# Patient Record
Sex: FEMALE
[De-identification: names, ages, dates, MRNs, and addresses within clinical notes are randomized per-mention and may not be internally consistent; named-entity substitution may affect disease eponyms.]

---

## 2020-02-27 ENCOUNTER — HOSPITAL ENCOUNTER (EMERGENCY)
Dept: HOSPITAL 88 - ER | Age: 30
Discharge: HOME | End: 2020-02-27
Payer: COMMERCIAL

## 2020-02-27 VITALS — WEIGHT: 198 LBS | HEIGHT: 62 IN | BODY MASS INDEX: 36.44 KG/M2

## 2020-02-27 DIAGNOSIS — K21.9: ICD-10-CM

## 2020-02-27 DIAGNOSIS — R07.89: Primary | ICD-10-CM

## 2020-02-27 LAB
ALBUMIN SERPL-MCNC: 4.1 G/DL (ref 3.5–5)
ALBUMIN/GLOB SERPL: 1.1 {RATIO} (ref 0.8–2)
ALP SERPL-CCNC: 83 IU/L (ref 40–150)
ALT SERPL-CCNC: 43 IU/L (ref 0–55)
ANION GAP SERPL CALC-SCNC: 10.4 MMOL/L (ref 8–16)
BACTERIA URNS QL MICRO: (no result) /HPF
BASOPHILS # BLD AUTO: 0.1 10*3/UL (ref 0–0.1)
BASOPHILS NFR BLD AUTO: 0.9 % (ref 0–1)
BILIRUB UR QL: NEGATIVE
BUN SERPL-MCNC: 12 MG/DL (ref 7–26)
BUN/CREAT SERPL: 18 (ref 6–25)
CALCIUM SERPL-MCNC: 9.1 MG/DL (ref 8.4–10.2)
CHLORIDE SERPL-SCNC: 104 MMOL/L (ref 98–107)
CLARITY UR: (no result)
CO2 SERPL-SCNC: 26 MMOL/L (ref 22–29)
COLOR UR: YELLOW
DEPRECATED NEUTROPHILS # BLD AUTO: 5.4 10*3/UL (ref 2.1–6.9)
DEPRECATED RBC URNS MANUAL-ACNC: (no result) /HPF (ref 0–5)
EGFRCR SERPLBLD CKD-EPI 2021: > 60 ML/MIN (ref 60–?)
EOSINOPHIL # BLD AUTO: 0.1 10*3/UL (ref 0–0.4)
EOSINOPHIL NFR BLD AUTO: 1.5 % (ref 0–6)
EPI CELLS URNS QL MICRO: (no result) /LPF
ERYTHROCYTE [DISTWIDTH] IN CORD BLOOD: 13 % (ref 11.7–14.4)
GLOBULIN PLAS-MCNC: 3.8 G/DL (ref 2.3–3.5)
GLUCOSE SERPLBLD-MCNC: 79 MG/DL (ref 74–118)
HCG UR QL: NEGATIVE
HCT VFR BLD AUTO: 40.5 % (ref 34.2–44.1)
HGB BLD-MCNC: 13.7 G/DL (ref 12–16)
KETONES UR QL STRIP.AUTO: (no result)
LEUKOCYTE ESTERASE UR QL STRIP.AUTO: NEGATIVE
LIPASE SERPL-CCNC: 20 U/L (ref 8–78)
LYMPHOCYTES # BLD: 2 10*3/UL (ref 1–3.2)
LYMPHOCYTES NFR BLD AUTO: 25.6 % (ref 18–39.1)
MCH RBC QN AUTO: 29 PG (ref 28–32)
MCHC RBC AUTO-ENTMCNC: 33.8 G/DL (ref 31–35)
MCV RBC AUTO: 85.8 FL (ref 81–99)
MONOCYTES # BLD AUTO: 0.3 10*3/UL (ref 0.2–0.8)
MONOCYTES NFR BLD AUTO: 3.8 % (ref 4.4–11.3)
NEUTS SEG NFR BLD AUTO: 67.4 % (ref 38.7–80)
NITRITE UR QL STRIP.AUTO: NEGATIVE
PLATELET # BLD AUTO: 292 X10E3/UL (ref 140–360)
POTASSIUM SERPL-SCNC: 3.4 MMOL/L (ref 3.5–5.1)
PROT UR QL STRIP.AUTO: NEGATIVE
RBC # BLD AUTO: 4.72 X10E6/UL (ref 3.6–5.1)
SODIUM SERPL-SCNC: 137 MMOL/L (ref 136–145)
SP GR UR STRIP: 1.03 (ref 1.01–1.02)
UROBILINOGEN UR STRIP-MCNC: 0.2 MG/DL (ref 0.2–1)
WBC #/AREA URNS HPF: (no result) /HPF (ref 0–5)

## 2020-02-27 PROCEDURE — 85025 COMPLETE CBC W/AUTO DIFF WBC: CPT

## 2020-02-27 PROCEDURE — 71045 X-RAY EXAM CHEST 1 VIEW: CPT

## 2020-02-27 PROCEDURE — 84484 ASSAY OF TROPONIN QUANT: CPT

## 2020-02-27 PROCEDURE — 80053 COMPREHEN METABOLIC PANEL: CPT

## 2020-02-27 PROCEDURE — 93005 ELECTROCARDIOGRAM TRACING: CPT

## 2020-02-27 PROCEDURE — 83690 ASSAY OF LIPASE: CPT

## 2020-02-27 PROCEDURE — 81025 URINE PREGNANCY TEST: CPT

## 2020-02-27 PROCEDURE — 99284 EMERGENCY DEPT VISIT MOD MDM: CPT

## 2020-02-27 PROCEDURE — 36415 COLL VENOUS BLD VENIPUNCTURE: CPT

## 2020-02-27 PROCEDURE — 81001 URINALYSIS AUTO W/SCOPE: CPT

## 2020-02-27 PROCEDURE — 87086 URINE CULTURE/COLONY COUNT: CPT

## 2020-02-27 PROCEDURE — 76705 ECHO EXAM OF ABDOMEN: CPT

## 2020-02-27 NOTE — DIAGNOSTIC IMAGING REPORT
TECHNIQUE: Frontal view of the chest.



INDICATION: 29-year-old woman with chest pressure.



COMPARISON: Chest CT 1/4/2020, chest radiographs 1/3/2020.





FINDINGS:



LINES/TUBES: None.



LUNGS: The lungs are well inflated and clear.



PLEURA: No pneumothorax or significant pleural effusion.



HEART AND MEDIASTINUM: The cardiomediastinal silhouette is within normal

limits.



SOFT TISSUES AND BONES: Unremarkable.





IMPRESSION:

No acute cardiopulmonary abnormalities.



Signed by: Richard Stevenson MD on 2/27/2020 3:34 PM

## 2020-02-27 NOTE — XMS REPORT
Summary of Care

                             Created on: 2020



Kermit Jacquelyn GRIMES

External Reference #: [id number]

: 1990

Sex: Female



Demographics







                          Address                   [address] [facility] [address]

[place]  [address]

 

                          Home Phone                [phone]

 

                          Preferred Language        English

 

                          Marital Status            

 

                          P[employer]ism Affiliation     [employer]

 

                          Race                      White

 

                          Ethnic Group              /Latin





Author







                          Author                    [facility]

 

                          Organization              [facility]

 

                          Address                   Unknown

 

                          Phone                     Unavailable







Support







                Name            Relationship    Address         Phone

 

                    Keaton Bradford      ECON                [address] [facility] [address]

[place]  [address]                     [phone]

 

                    Eliza King      EC[employer]                [address] [facility] [address]

[place]  [address]                     Unavailable







Care Team Providers







                    Care Team Member Name    Role                Phone

 

                    Liliana Becker CNM    PCP                 [phone]







Encounter Details







                          Care Team                 Description



                     Date                Type                Department  

 

                                        



Doctor Unassigned, [place]



[address] [place] [address]                      



                     2020          Orders Only         [facility]  



                                         [address] [place] [address]  







Allergies

No Known Allergiesdocumented as of this encounter (statuses as of 2020)



Medications

No known medicationsdocumented as of this encounter (statuses as of 2020)



Active Problems







 



                           Problem                   Noted Date

 

 



                           History of migraine with aura     2019

 

 



                           Obesity (BMI 30-39.9)     2018

 

 



                           Well woman exam           2018



documented as of this encounter (statuses as of 2020)



Resolved Problems







  



                     Problem             Noted Date          Resolved Date

 

  



                      delivery delivered     2011

 

 



                                         Overview:



                                         ICD10 Diagnosis Term Replacer Utility



documented as of this encounter (statuses as of 2020)



Immunizations







  



                     Name                Administration Dates     Next Due

 

  



                           MMR                       2011 



documented as of this encounter



Social History







                                        Date



                 Tobacco Use     Types           Packs/Day       Years Used 

 

                                        2018 - 2018



                     Former Smoker       Cigarettes          0.5  

 

    



                                         Smokeless Tobacco: Never   



                                         Used   









                                        Comments: Quit smoking in 2018









                    Drinks/Week         oz/Week             Comments



                                         Alcohol Use   

 

                                                             



                                         No   









 



                           Sex Assigned at Birth     Date Recorded

 

 



                                         Not on file 









                                        Industry



                           Job Start Date            Occupation 

 

                                        Not on file



                           Not on file               Not on file 









                                        Travel End



                           Travel History            Travel Start 

 





                                         No recent travel history available.



documented as of this encounter



Last Filed Vital Signs

Not on filedocumented in this encounter



Plan of Treatment







                          Care Team                 Description



                     Date                Type                Specialty  

 

                                        



SharonJennifer L, CN



[address]



[place], [place] [address] [phone] (Fax)                       



                     2020          Office Visit        [facility]  









   



                 Health Maintenance     Due Date        Last Done       Comments

 

   



                           VARICELLA VACCINES (1 of     1990  



                                         2 - 2-dose childhood   



                                         series)   

 

   



                           DTaP,Tdap,and Td Vaccines     2000  



                                         (1 - Tdap)   

 

   



                     PAP SMEAR           2019, 2011 

 

   



                           INFLUENZA VACCINE (#1)     2019  

 

   



                     PNEUMOCOCCAL 0-64 YEARS     Aged Out            No longer eligible based



                           COMBINED SERIES           on patient's age to



                                         complete this topic



documented as of this encounter



Procedures







                                        Comments



                 Procedure Name     Priority        Date/Time       Associated Diagnosis 

 

                                         



                     ASSIGNMENT OF BENEFITS     Routine             2020  



                                         8:01 AM CST  



documented in this encounter



Results

Not on filedocumented in this encounter

## 2020-02-27 NOTE — DIAGNOSTIC IMAGING REPORT
EXAM: Right upper quadrant abdominal ultrasound



INDICATION:   Right upper quadrant pain 

COMPARISON: None. 

TECHNIQUE: Transverse and longitudinal images of the right upper quadrant

abdomen were obtained



FINDINGS:     

Liver:

Size: 12.0 cm in the right midclavicular line, normal

Appearance: Normal echogenicity, smooth contour

Mass: No focal masses



Gallbladder: 1.6 cm stone in the gallbladder. No gallbladder distension,

pericholecystic fluid, wall thickening, or reported sonographic Palm's sign.

Gallbladder wall measures 3 mm. 



Bile Ducts:

Intrahepatic Ducts: No dilatation

Extrahepatic Ducts: Common bile duct measures 3 mm

Pancreas:

Visualized portions of the pancreatic head, neck and proximal body are normal.



Kidney: The right kidney measures 11.4 cm without evidence of hydronephrosis or

stone. 



Vessels:

Aorta: Visualized portions are normal

Inferior Vena Cava: Visualized portions are normal

Main Portal Vein: 0 point cm, normal size with hepatopetal flow.



Free Fluid:

No ascites or pleural effusion



IMPRESSION:

Cholelithiasis without sonographic evidence of cholecystitis.



Signed by: Jose C Carlos MD on 2/27/2020 3:47 PM